# Patient Record
Sex: FEMALE | Race: WHITE | NOT HISPANIC OR LATINO
[De-identification: names, ages, dates, MRNs, and addresses within clinical notes are randomized per-mention and may not be internally consistent; named-entity substitution may affect disease eponyms.]

---

## 2018-09-12 ENCOUNTER — RESULT REVIEW (OUTPATIENT)
Age: 53
End: 2018-09-12

## 2019-04-01 ENCOUNTER — EMERGENCY (EMERGENCY)
Facility: HOSPITAL | Age: 54
LOS: 1 days | Discharge: HOME | End: 2019-04-01
Attending: EMERGENCY MEDICINE | Admitting: EMERGENCY MEDICINE
Payer: COMMERCIAL

## 2019-04-01 VITALS
SYSTOLIC BLOOD PRESSURE: 123 MMHG | WEIGHT: 169.98 LBS | RESPIRATION RATE: 18 BRPM | HEART RATE: 92 BPM | HEIGHT: 68 IN | DIASTOLIC BLOOD PRESSURE: 85 MMHG | TEMPERATURE: 99 F | OXYGEN SATURATION: 98 %

## 2019-04-01 VITALS
RESPIRATION RATE: 16 BRPM | HEART RATE: 69 BPM | TEMPERATURE: 99 F | OXYGEN SATURATION: 100 % | DIASTOLIC BLOOD PRESSURE: 75 MMHG | SYSTOLIC BLOOD PRESSURE: 109 MMHG

## 2019-04-01 DIAGNOSIS — Z90.13 ACQUIRED ABSENCE OF BILATERAL BREASTS AND NIPPLES: Chronic | ICD-10-CM

## 2019-04-01 DIAGNOSIS — Z90.722 ACQUIRED ABSENCE OF OVARIES, BILATERAL: Chronic | ICD-10-CM

## 2019-04-01 LAB
ALBUMIN SERPL ELPH-MCNC: 3.7 G/DL — SIGNIFICANT CHANGE UP (ref 3.3–5)
ALP SERPL-CCNC: 52 U/L — SIGNIFICANT CHANGE UP (ref 40–120)
ALT FLD-CCNC: 25 U/L — SIGNIFICANT CHANGE UP (ref 12–78)
ANION GAP SERPL CALC-SCNC: 8 MMOL/L — SIGNIFICANT CHANGE UP (ref 5–17)
APPEARANCE UR: CLEAR — SIGNIFICANT CHANGE UP
AST SERPL-CCNC: 13 U/L — LOW (ref 15–37)
BACTERIA # UR AUTO: NEGATIVE — SIGNIFICANT CHANGE UP
BASOPHILS # BLD AUTO: 0 K/UL — SIGNIFICANT CHANGE UP (ref 0–0.2)
BASOPHILS NFR BLD AUTO: 0 % — SIGNIFICANT CHANGE UP (ref 0–2)
BILIRUB SERPL-MCNC: 0.5 MG/DL — SIGNIFICANT CHANGE UP (ref 0.2–1.2)
BILIRUB UR-MCNC: NEGATIVE — SIGNIFICANT CHANGE UP
BUN SERPL-MCNC: 13 MG/DL — SIGNIFICANT CHANGE UP (ref 7–23)
CALCIUM SERPL-MCNC: 8.3 MG/DL — LOW (ref 8.5–10.1)
CHLORIDE SERPL-SCNC: 108 MMOL/L — SIGNIFICANT CHANGE UP (ref 96–108)
CO2 SERPL-SCNC: 27 MMOL/L — SIGNIFICANT CHANGE UP (ref 22–31)
COLOR SPEC: SIGNIFICANT CHANGE UP
CREAT SERPL-MCNC: 0.73 MG/DL — SIGNIFICANT CHANGE UP (ref 0.5–1.3)
DIFF PNL FLD: ABNORMAL
EOSINOPHIL # BLD AUTO: 0.19 K/UL — SIGNIFICANT CHANGE UP (ref 0–0.5)
EOSINOPHIL NFR BLD AUTO: 2 % — SIGNIFICANT CHANGE UP (ref 0–6)
EPI CELLS # UR: NEGATIVE — SIGNIFICANT CHANGE UP
GLUCOSE SERPL-MCNC: 109 MG/DL — HIGH (ref 70–99)
GLUCOSE UR QL: NEGATIVE — SIGNIFICANT CHANGE UP
HCT VFR BLD CALC: 41.8 % — SIGNIFICANT CHANGE UP (ref 34.5–45)
HGB BLD-MCNC: 13.5 G/DL — SIGNIFICANT CHANGE UP (ref 11.5–15.5)
KETONES UR-MCNC: ABNORMAL
LEUKOCYTE ESTERASE UR-ACNC: NEGATIVE — SIGNIFICANT CHANGE UP
LIDOCAIN IGE QN: 153 U/L — SIGNIFICANT CHANGE UP (ref 73–393)
LYMPHOCYTES # BLD AUTO: 0.29 K/UL — LOW (ref 1–3.3)
LYMPHOCYTES # BLD AUTO: 3 % — LOW (ref 13–44)
MANUAL SMEAR VERIFICATION: SIGNIFICANT CHANGE UP
MCHC RBC-ENTMCNC: 29.6 PG — SIGNIFICANT CHANGE UP (ref 27–34)
MCHC RBC-ENTMCNC: 32.3 GM/DL — SIGNIFICANT CHANGE UP (ref 32–36)
MCV RBC AUTO: 91.7 FL — SIGNIFICANT CHANGE UP (ref 80–100)
MONOCYTES # BLD AUTO: 0.49 K/UL — SIGNIFICANT CHANGE UP (ref 0–0.9)
MONOCYTES NFR BLD AUTO: 5 % — SIGNIFICANT CHANGE UP (ref 2–14)
NEUTROPHILS # BLD AUTO: 8.74 K/UL — HIGH (ref 1.8–7.4)
NEUTROPHILS NFR BLD AUTO: 84 % — HIGH (ref 43–77)
NEUTS BAND # BLD: 6 % — SIGNIFICANT CHANGE UP (ref 0–8)
NITRITE UR-MCNC: NEGATIVE — SIGNIFICANT CHANGE UP
NRBC # BLD: 0 — SIGNIFICANT CHANGE UP
NRBC # BLD: SIGNIFICANT CHANGE UP /100 WBCS (ref 0–0)
PH UR: 6 — SIGNIFICANT CHANGE UP (ref 5–8)
PLAT MORPH BLD: NORMAL — SIGNIFICANT CHANGE UP
PLATELET # BLD AUTO: 304 K/UL — SIGNIFICANT CHANGE UP (ref 150–400)
POTASSIUM SERPL-MCNC: 4 MMOL/L — SIGNIFICANT CHANGE UP (ref 3.5–5.3)
POTASSIUM SERPL-SCNC: 4 MMOL/L — SIGNIFICANT CHANGE UP (ref 3.5–5.3)
PROT SERPL-MCNC: 6.7 G/DL — SIGNIFICANT CHANGE UP (ref 6–8.3)
PROT UR-MCNC: NEGATIVE — SIGNIFICANT CHANGE UP
RBC # BLD: 4.56 M/UL — SIGNIFICANT CHANGE UP (ref 3.8–5.2)
RBC # FLD: 12.6 % — SIGNIFICANT CHANGE UP (ref 10.3–14.5)
RBC BLD AUTO: SIGNIFICANT CHANGE UP
RBC CASTS # UR COMP ASSIST: ABNORMAL /HPF (ref 0–4)
SODIUM SERPL-SCNC: 143 MMOL/L — SIGNIFICANT CHANGE UP (ref 135–145)
SP GR SPEC: 1 — LOW (ref 1.01–1.02)
UROBILINOGEN FLD QL: NEGATIVE — SIGNIFICANT CHANGE UP
WBC # BLD: 9.71 K/UL — SIGNIFICANT CHANGE UP (ref 3.8–10.5)
WBC # FLD AUTO: 9.71 K/UL — SIGNIFICANT CHANGE UP (ref 3.8–10.5)
WBC UR QL: SIGNIFICANT CHANGE UP

## 2019-04-01 PROCEDURE — 99284 EMERGENCY DEPT VISIT MOD MDM: CPT

## 2019-04-01 PROCEDURE — 96375 TX/PRO/DX INJ NEW DRUG ADDON: CPT

## 2019-04-01 PROCEDURE — 80053 COMPREHEN METABOLIC PANEL: CPT

## 2019-04-01 PROCEDURE — 96374 THER/PROPH/DIAG INJ IV PUSH: CPT

## 2019-04-01 PROCEDURE — 81001 URINALYSIS AUTO W/SCOPE: CPT

## 2019-04-01 PROCEDURE — 85027 COMPLETE CBC AUTOMATED: CPT

## 2019-04-01 PROCEDURE — 83690 ASSAY OF LIPASE: CPT

## 2019-04-01 PROCEDURE — 36415 COLL VENOUS BLD VENIPUNCTURE: CPT

## 2019-04-01 PROCEDURE — 99284 EMERGENCY DEPT VISIT MOD MDM: CPT | Mod: 25

## 2019-04-01 PROCEDURE — 96361 HYDRATE IV INFUSION ADD-ON: CPT

## 2019-04-01 RX ORDER — SODIUM CHLORIDE 9 MG/ML
1000 INJECTION INTRAMUSCULAR; INTRAVENOUS; SUBCUTANEOUS ONCE
Qty: 0 | Refills: 0 | Status: COMPLETED | OUTPATIENT
Start: 2019-04-01 | End: 2019-04-01

## 2019-04-01 RX ORDER — ONDANSETRON 8 MG/1
1 TABLET, FILM COATED ORAL
Qty: 12 | Refills: 0 | OUTPATIENT
Start: 2019-04-01 | End: 2019-04-03

## 2019-04-01 RX ORDER — ONDANSETRON 8 MG/1
4 TABLET, FILM COATED ORAL ONCE
Qty: 0 | Refills: 0 | Status: COMPLETED | OUTPATIENT
Start: 2019-04-01 | End: 2019-04-01

## 2019-04-01 RX ORDER — FAMOTIDINE 10 MG/ML
1 INJECTION INTRAVENOUS
Qty: 10 | Refills: 0 | OUTPATIENT
Start: 2019-04-01 | End: 2019-04-05

## 2019-04-01 RX ORDER — FAMOTIDINE 10 MG/ML
20 INJECTION INTRAVENOUS ONCE
Qty: 0 | Refills: 0 | Status: COMPLETED | OUTPATIENT
Start: 2019-04-01 | End: 2019-04-01

## 2019-04-01 RX ORDER — ACETAMINOPHEN 500 MG
650 TABLET ORAL ONCE
Qty: 0 | Refills: 0 | Status: DISCONTINUED | OUTPATIENT
Start: 2019-04-01 | End: 2019-04-05

## 2019-04-01 RX ADMIN — SODIUM CHLORIDE 1000 MILLILITER(S): 9 INJECTION INTRAMUSCULAR; INTRAVENOUS; SUBCUTANEOUS at 08:19

## 2019-04-01 RX ADMIN — SODIUM CHLORIDE 1000 MILLILITER(S): 9 INJECTION INTRAMUSCULAR; INTRAVENOUS; SUBCUTANEOUS at 07:15

## 2019-04-01 RX ADMIN — SODIUM CHLORIDE 1000 MILLILITER(S): 9 INJECTION INTRAMUSCULAR; INTRAVENOUS; SUBCUTANEOUS at 05:45

## 2019-04-01 RX ADMIN — ONDANSETRON 4 MILLIGRAM(S): 8 TABLET, FILM COATED ORAL at 05:45

## 2019-04-01 RX ADMIN — FAMOTIDINE 20 MILLIGRAM(S): 10 INJECTION INTRAVENOUS at 05:45

## 2019-04-01 RX ADMIN — SODIUM CHLORIDE 1000 MILLILITER(S): 9 INJECTION INTRAMUSCULAR; INTRAVENOUS; SUBCUTANEOUS at 07:18

## 2019-04-01 NOTE — ED ADULT NURSE NOTE - OBJECTIVE STATEMENT
received patient in bed 10a, witnessed ambulating. C/o N/V and diarrhea since last night. Cramping earlier in night with diarrhea. IV/labs/Meds/IVF completed at this time. Awaiting re-eval

## 2019-04-01 NOTE — ED PROVIDER NOTE - PROGRESS NOTE DETAILS
patient abdomen soft, non tender, labs reviwed, to dc home, given instructions to return to ER for worsening of symptoms

## 2019-04-01 NOTE — ED PROVIDER NOTE - OBJECTIVE STATEMENT
54 female presents to ER c/o nausea, vomiting and diarrhea, states she went out to restaurant and ate sushi, states her son has the same symptoms.

## 2019-04-02 ENCOUNTER — TRANSCRIPTION ENCOUNTER (OUTPATIENT)
Age: 54
End: 2019-04-02

## 2020-12-14 NOTE — ED ADULT NURSE NOTE - PRO INTERPRETER NEED 2
Patient will now be using [facility] to complete her MRI. Patient's appointment is scheduled for 12/21.      [facility]   [id number] [address] [place]

## 2022-03-15 NOTE — ED ADULT NURSE NOTE - PMH
Addended by: PRABHA MONROY on: 3/15/2022 02:26 PM     Modules accepted: Orders     Anxiety    Hypothyroid

## 2022-12-12 ENCOUNTER — OFFICE (OUTPATIENT)
Dept: URBAN - METROPOLITAN AREA CLINIC 70 | Facility: CLINIC | Age: 57
Setting detail: OPHTHALMOLOGY
End: 2022-12-12
Payer: COMMERCIAL

## 2022-12-12 DIAGNOSIS — H16.222: ICD-10-CM

## 2022-12-12 DIAGNOSIS — H40.013: ICD-10-CM

## 2022-12-12 DIAGNOSIS — H35.54: ICD-10-CM

## 2022-12-12 DIAGNOSIS — H16.221: ICD-10-CM

## 2022-12-12 DIAGNOSIS — D31.32: ICD-10-CM

## 2022-12-12 DIAGNOSIS — H52.7: ICD-10-CM

## 2022-12-12 DIAGNOSIS — H25.13: ICD-10-CM

## 2022-12-12 DIAGNOSIS — H16.223: ICD-10-CM

## 2022-12-12 PROCEDURE — 76514 ECHO EXAM OF EYE THICKNESS: CPT | Performed by: OPHTHALMOLOGY

## 2022-12-12 PROCEDURE — 92250 FUNDUS PHOTOGRAPHY W/I&R: CPT | Performed by: OPHTHALMOLOGY

## 2022-12-12 PROCEDURE — 92015 DETERMINE REFRACTIVE STATE: CPT | Performed by: OPHTHALMOLOGY

## 2022-12-12 PROCEDURE — 92014 COMPRE OPH EXAM EST PT 1/>: CPT | Performed by: OPHTHALMOLOGY

## 2022-12-12 PROCEDURE — 83861 MICROFLUID ANALY TEARS: CPT | Performed by: OPHTHALMOLOGY

## 2022-12-12 ASSESSMENT — CONFRONTATIONAL VISUAL FIELD TEST (CVF)
OD_FINDINGS: FULL
OS_FINDINGS: FULL

## 2022-12-12 ASSESSMENT — PACHYMETRY
OD_CT_CORRECTION: -5
OS_CT_UM: 636
OD_CT_UM: 616
OS_CT_CORRECTION: -6

## 2022-12-12 ASSESSMENT — SUPERFICIAL PUNCTATE KERATITIS (SPK)
OS_SPK: 1+
OD_SPK: 1+

## 2022-12-14 PROBLEM — H16.222 DRY EYE SYNDROME K SICCA; RIGHT EYE, LEFT EYE, BOTH EYES: Status: ACTIVE | Noted: 2022-12-12

## 2022-12-14 PROBLEM — H16.221 DRY EYE SYNDROME K SICCA; RIGHT EYE, LEFT EYE, BOTH EYES: Status: ACTIVE | Noted: 2022-12-12

## 2022-12-14 PROBLEM — H16.223 DRY EYE SYNDROME K SICCA; RIGHT EYE, LEFT EYE, BOTH EYES: Status: ACTIVE | Noted: 2022-12-12

## 2022-12-14 ASSESSMENT — AXIALLENGTH_DERIVED
OD_AL: 23.9449
OD_AL: 24.1473
OS_AL: 23.9006
OS_AL: 23.801
OD_AL: 24.0457
OS_AL: 23.8507

## 2022-12-14 ASSESSMENT — REFRACTION_AUTOREFRACTION
OS_CYLINDER: -0.75
OD_CYLINDER: -0.25
OS_AXIS: 096
OD_AXIS: 090
OD_SPHERE: +1.00
OS_SPHERE: +1.25

## 2022-12-14 ASSESSMENT — REFRACTION_CURRENTRX
OS_CYLINDER: -0.50
OD_CYLINDER: -0.50
OD_SPHERE: +3.00
OS_OVR_VA: 20/
OS_SPHERE: +3.00
OS_AXIS: 090
OD_AXIS: 092
OD_OVR_VA: 20/

## 2022-12-14 ASSESSMENT — REFRACTION_MANIFEST
OD_SPHERE: +0.50
OD_CYLINDER: -0.25
OS_AXIS: 100
OU_VA: 20/20
OS_ADD: +2.75
OS_CYLINDER: -0.50
OS_VA1: 20/20
OS_SPHERE: +1.25
OS_CYLINDER: -0.50
OD_VA1: 20/25
OD_VA1: 20/20
OD_ADD: +3.00
OS_SPHERE: +1.00
OD_VA2: 20/20
OS_ADD: +3.00
OD_AXIS: 90
OS_VA2: 20/20
OD_AXIS: 080
OD_CYLINDER: -0.25
OD_ADD: +2.75
OD_SPHERE: +0.75
OS_AXIS: 095
OS_VA1: 20/25

## 2022-12-14 ASSESSMENT — SPHEQUIV_DERIVED
OD_SPHEQUIV: 0.625
OS_SPHEQUIV: 0.875
OD_SPHEQUIV: 0.875
OD_SPHEQUIV: 0.375
OS_SPHEQUIV: 1
OS_SPHEQUIV: 0.75

## 2022-12-14 ASSESSMENT — KERATOMETRY
OS_AXISANGLE_DEGREES: 102
OS_K2POWER_DIOPTERS: 42.00
OD_K2POWER_DIOPTERS: 41.75
OD_K1POWER_DIOPTERS: 41.50
OS_K1POWER_DIOPTERS: 41.75
OD_AXISANGLE_DEGREES: 060

## 2022-12-14 ASSESSMENT — VISUAL ACUITY
OD_BCVA: 20/40
OS_BCVA: 20/50

## 2023-09-27 ENCOUNTER — OFFICE (OUTPATIENT)
Dept: URBAN - METROPOLITAN AREA CLINIC 70 | Facility: CLINIC | Age: 58
Setting detail: OPHTHALMOLOGY
End: 2023-09-27
Payer: COMMERCIAL

## 2023-09-27 DIAGNOSIS — H16.223: ICD-10-CM

## 2023-09-27 DIAGNOSIS — D31.32: ICD-10-CM

## 2023-09-27 DIAGNOSIS — H25.11: ICD-10-CM

## 2023-09-27 DIAGNOSIS — H40.013: ICD-10-CM

## 2023-09-27 DIAGNOSIS — H25.13: ICD-10-CM

## 2023-09-27 DIAGNOSIS — H35.54: ICD-10-CM

## 2023-09-27 PROBLEM — H25.12 CATARACT SENILE NUCLEAR SCLEROSIS; RIGHT EYE, LEFT EYE, BOTH EYES: Status: ACTIVE | Noted: 2023-09-27

## 2023-09-27 PROCEDURE — 92133 CPTRZD OPH DX IMG PST SGM ON: CPT | Performed by: OPHTHALMOLOGY

## 2023-09-27 PROCEDURE — 92136 OPHTHALMIC BIOMETRY: CPT | Performed by: OPHTHALMOLOGY

## 2023-09-27 PROCEDURE — 92014 COMPRE OPH EXAM EST PT 1/>: CPT | Performed by: OPHTHALMOLOGY

## 2023-09-27 ASSESSMENT — REFRACTION_MANIFEST
OS_CYLINDER: -0.50
OD_ADD: +2.75
OD_SPHERE: +0.50
OS_CYLINDER: -0.50
OD_ADD: +2.75
OS_ADD: +3.00
OD_SPHERE: +0.75
OD_CYLINDER: -0.25
OS_AXIS: 100
OS_VA1: 20/20
OD_ADD: +3.00
OS_AXIS: 100
OS_VA1: 20/25
OD_AXIS: 080
OD_VA1: 20/25
OS_SPHERE: +1.00
OD_AXIS: 90
OS_VA2: 20/20
OS_SPHERE: +1.25
OS_SPHERE: +1.00
OD_AXIS: 100
OS_CYLINDER: -0.50
OD_VA1: 20/25
OU_VA: 20/20
OS_ADD: +2.75
OD_CYLINDER: -0.25
OS_AXIS: 095
OD_VA2: 20/20
OS_VA1: 20/25
OD_CYLINDER: -0.25
OD_SPHERE: +0.75
OD_VA1: 20/20
OS_ADD: +2.75

## 2023-09-27 ASSESSMENT — REFRACTION_AUTOREFRACTION
OS_CYLINDER: -0.75
OS_AXIS: 103
OD_AXIS: 100
OD_SPHERE: +0.75
OD_CYLINDER: -0.25
OS_SPHERE: +1.25

## 2023-09-27 ASSESSMENT — SPHEQUIV_DERIVED
OS_SPHEQUIV: 0.75
OS_SPHEQUIV: 0.875
OS_SPHEQUIV: 1
OD_SPHEQUIV: 0.625
OD_SPHEQUIV: 0.375
OS_SPHEQUIV: 0.75
OD_SPHEQUIV: 0.625
OD_SPHEQUIV: 0.625

## 2023-09-27 ASSESSMENT — KERATOMETRY
OD_AXISANGLE_DEGREES: 090
OD_K2POWER_DIOPTERS: 41.75
OS_K2POWER_DIOPTERS: 42.00
OS_K1K2_AVERAGE: 41.75
OD_K1POWER_DIOPTERS: 41.75
OD_AXISANGLE2_DEGREES: 090
OS_AXISANGLE_DEGREES: 100
OS_AXISANGLE_DEGREES: 100
OD_K1K2_AVERAGE: 41.75
OS_K1POWER_DIOPTERS: 41.50
OS_CYLAXISANGLE_DEGREES: 100
OD_CYLAXISANGLE_DEGREES: 90
OD_K2POWER_DIOPTERS: 41.75
OD_K1POWER_DIOPTERS: 41.75
OS_AXISANGLE2_DEGREES: 100
OS_K2POWER_DIOPTERS: 42.00
OS_CYLPOWER_DEGREES: 0.5
OS_K1POWER_DIOPTERS: 41.50
OD_AXISANGLE_DEGREES: 090

## 2023-09-27 ASSESSMENT — REFRACTION_CURRENTRX
OS_CYLINDER: -0.50
OS_AXIS: 090
OS_SPHERE: +3.00
OD_AXIS: 092
OD_OVR_VA: 20/
OD_SPHERE: +3.00
OS_OVR_VA: 20/
OD_CYLINDER: -0.50

## 2023-09-27 ASSESSMENT — AXIALLENGTH_DERIVED
OD_AL: 24.0993
OS_AL: 23.8478
OS_AL: 23.9478
OS_AL: 23.8977
OD_AL: 23.9981
OS_AL: 23.9478
OD_AL: 23.9981
OD_AL: 23.9981

## 2023-09-27 ASSESSMENT — CONFRONTATIONAL VISUAL FIELD TEST (CVF)
OD_FINDINGS: FULL
OS_FINDINGS: FULL

## 2023-09-27 ASSESSMENT — VISUAL ACUITY
OS_BCVA: 20/50
OD_BCVA: 20/40

## 2023-09-27 ASSESSMENT — SUPERFICIAL PUNCTATE KERATITIS (SPK)
OD_SPK: 1+
OS_SPK: 1+

## 2023-11-24 ENCOUNTER — ASC (OUTPATIENT)
Dept: URBAN - METROPOLITAN AREA SURGERY 8 | Facility: SURGERY | Age: 58
Setting detail: OPHTHALMOLOGY
End: 2023-11-24
Payer: COMMERCIAL

## 2023-11-24 DIAGNOSIS — H25.11: ICD-10-CM

## 2023-11-24 DIAGNOSIS — H52.211: ICD-10-CM

## 2023-11-24 PROCEDURE — 66984 XCAPSL CTRC RMVL W/O ECP: CPT | Mod: RT | Performed by: OPHTHALMOLOGY

## 2023-11-24 PROCEDURE — FEMTO PRECISION LASER CATARACT SURGERY: Mod: GY | Performed by: OPHTHALMOLOGY

## 2023-11-25 ENCOUNTER — RX ONLY (RX ONLY)
Age: 58
End: 2023-11-25

## 2023-11-25 ENCOUNTER — OFFICE (OUTPATIENT)
Dept: URBAN - METROPOLITAN AREA CLINIC 70 | Facility: CLINIC | Age: 58
Setting detail: OPHTHALMOLOGY
End: 2023-11-25
Payer: COMMERCIAL

## 2023-11-25 DIAGNOSIS — Z96.1: ICD-10-CM

## 2023-11-25 PROCEDURE — 99024 POSTOP FOLLOW-UP VISIT: CPT | Performed by: OPHTHALMOLOGY

## 2023-11-25 ASSESSMENT — REFRACTION_MANIFEST
OS_SPHERE: +1.00
OD_SPHERE: +0.75
OD_ADD: +3.00
OD_CYLINDER: -0.25
OS_ADD: +2.75
OD_ADD: +2.75
OD_AXIS: 080
OD_VA1: 20/20
OS_ADD: +2.75
OD_ADD: +2.75
OD_CYLINDER: -0.25
OS_AXIS: 100
OD_VA1: 20/25
OD_AXIS: 90
OS_SPHERE: +1.00
OU_VA: 20/20
OS_CYLINDER: -0.50
OD_SPHERE: +0.75
OS_CYLINDER: -0.50
OS_VA1: 20/25
OD_VA1: 20/25
OS_ADD: +3.00
OS_VA1: 20/20
OS_AXIS: 095
OS_VA1: 20/25
OS_SPHERE: +1.25
OS_AXIS: 100
OS_VA2: 20/20
OD_SPHERE: +0.50
OD_CYLINDER: -0.25
OD_AXIS: 100
OS_CYLINDER: -0.50
OD_VA2: 20/20

## 2023-11-25 ASSESSMENT — SPHEQUIV_DERIVED
OS_SPHEQUIV: 1
OD_SPHEQUIV: 0.625
OD_SPHEQUIV: -0.5
OS_SPHEQUIV: 1.125
OS_SPHEQUIV: 0.75
OD_SPHEQUIV: 0.625
OD_SPHEQUIV: 0.375
OS_SPHEQUIV: 0.75

## 2023-11-25 ASSESSMENT — REFRACTION_AUTOREFRACTION
OS_AXIS: 098
OS_SPHERE: +1.50
OD_SPHERE: -0.25
OD_CYLINDER: -0.50
OD_AXIS: 046
OS_CYLINDER: -0.75

## 2023-11-25 ASSESSMENT — REFRACTION_CURRENTRX
OS_OVR_VA: 20/
OD_AXIS: 092
OS_AXIS: 090
OD_OVR_VA: 20/
OS_SPHERE: +3.00
OD_CYLINDER: -0.50
OS_CYLINDER: -0.50
OD_SPHERE: +3.00

## 2023-11-25 ASSESSMENT — CONFRONTATIONAL VISUAL FIELD TEST (CVF)
OD_FINDINGS: FULL
OS_FINDINGS: FULL

## 2023-11-25 ASSESSMENT — CORNEAL EDEMA CLINICAL DESCRIPTION: OD_CORNEALEDEMA: 1+

## 2023-11-25 ASSESSMENT — SUPERFICIAL PUNCTATE KERATITIS (SPK)
OS_SPK: 1+
OD_SPK: 1+

## 2023-11-30 ENCOUNTER — OFFICE (OUTPATIENT)
Dept: URBAN - METROPOLITAN AREA CLINIC 70 | Facility: CLINIC | Age: 58
Setting detail: OPHTHALMOLOGY
End: 2023-11-30
Payer: COMMERCIAL

## 2023-11-30 DIAGNOSIS — H25.12: ICD-10-CM

## 2023-11-30 DIAGNOSIS — Z96.1: ICD-10-CM

## 2023-11-30 PROCEDURE — 92136 OPHTHALMIC BIOMETRY: CPT | Performed by: OPHTHALMOLOGY

## 2023-11-30 PROCEDURE — 99024 POSTOP FOLLOW-UP VISIT: CPT | Performed by: OPHTHALMOLOGY

## 2023-11-30 ASSESSMENT — REFRACTION_MANIFEST
OD_AXIS: 100
OD_CYLINDER: -0.25
OS_ADD: +2.75
OD_VA1: 20/20
OS_ADD: +3.00
OD_CYLINDER: -0.25
OS_SPHERE: +1.25
OD_AXIS: 080
OS_VA1: 20/20
OD_VA1: 20/25
OD_ADD: +2.75
OD_SPHERE: +0.75
OS_SPHERE: +1.00
OS_AXIS: 100
OS_AXIS: 100
OS_SPHERE: +1.00
OU_VA: 20/20
OS_VA1: 20/25
OD_VA1: 20/25
OD_ADD: +2.75
OS_CYLINDER: -0.50
OD_SPHERE: +0.75
OS_CYLINDER: -0.50
OS_ADD: +2.75
OD_SPHERE: +0.50
OS_VA2: 20/20
OD_VA2: 20/20
OS_CYLINDER: -0.50
OD_ADD: +3.00
OS_VA1: 20/25
OS_AXIS: 095
OD_AXIS: 90
OD_CYLINDER: -0.25

## 2023-11-30 ASSESSMENT — SPHEQUIV_DERIVED
OS_SPHEQUIV: 0.75
OD_SPHEQUIV: 0.625
OD_SPHEQUIV: 0
OD_SPHEQUIV: 0.375
OD_SPHEQUIV: 0.625
OS_SPHEQUIV: 0.75
OS_SPHEQUIV: 1
OS_SPHEQUIV: 1

## 2023-11-30 ASSESSMENT — REFRACTION_CURRENTRX
OD_SPHERE: +3.00
OS_OVR_VA: 20/
OD_OVR_VA: 20/
OS_AXIS: 090
OS_CYLINDER: -0.50
OS_SPHERE: +3.00
OD_CYLINDER: -0.50
OD_AXIS: 092

## 2023-11-30 ASSESSMENT — REFRACTION_AUTOREFRACTION
OS_AXIS: 102
OD_SPHERE: +0.25
OD_AXIS: 083
OS_SPHERE: +1.50
OS_CYLINDER: -1.00
OD_CYLINDER: -0.50

## 2023-11-30 ASSESSMENT — CONFRONTATIONAL VISUAL FIELD TEST (CVF)
OS_FINDINGS: FULL
OD_FINDINGS: FULL

## 2023-11-30 ASSESSMENT — SUPERFICIAL PUNCTATE KERATITIS (SPK)
OS_SPK: 1+
OD_SPK: 1+

## 2023-12-04 NOTE — ED PROVIDER NOTE - EYES, MLM
[facility] Nurse Form  Form placed in [place] folder   Fax: [phone]
Faxed completed form.      Placed in "to be scanned" bin
Form has been completed and placed in the completed BLUE team folder in the clerical area.
Clear bilaterally, pupils equal, round and reactive to light.

## 2023-12-08 ENCOUNTER — ASC (OUTPATIENT)
Dept: URBAN - METROPOLITAN AREA SURGERY 8 | Facility: SURGERY | Age: 58
Setting detail: OPHTHALMOLOGY
End: 2023-12-08
Payer: COMMERCIAL

## 2023-12-08 ENCOUNTER — RX ONLY (RX ONLY)
Age: 58
End: 2023-12-08

## 2023-12-08 DIAGNOSIS — H52.212: ICD-10-CM

## 2023-12-08 DIAGNOSIS — H25.12: ICD-10-CM

## 2023-12-08 PROCEDURE — V2788P PANOPTIX: Performed by: OPHTHALMOLOGY

## 2023-12-08 PROCEDURE — FEMTO FEMTOSECOND LASER: Mod: GY | Performed by: OPHTHALMOLOGY

## 2023-12-08 PROCEDURE — 66984 XCAPSL CTRC RMVL W/O ECP: CPT | Mod: 79,LT | Performed by: OPHTHALMOLOGY

## 2023-12-09 ENCOUNTER — OFFICE (OUTPATIENT)
Dept: URBAN - METROPOLITAN AREA CLINIC 104 | Facility: CLINIC | Age: 58
Setting detail: OPHTHALMOLOGY
End: 2023-12-09
Payer: COMMERCIAL

## 2023-12-09 DIAGNOSIS — Z96.1: ICD-10-CM

## 2023-12-09 PROCEDURE — 99024 POSTOP FOLLOW-UP VISIT: CPT | Performed by: OPTOMETRIST

## 2023-12-09 ASSESSMENT — REFRACTION_AUTOREFRACTION
OS_SPHERE: +0.50
OD_CYLINDER: -0.75
OD_AXIS: 064
OD_SPHERE: +0.25

## 2023-12-09 ASSESSMENT — CONFRONTATIONAL VISUAL FIELD TEST (CVF)
OS_FINDINGS: FULL
OD_FINDINGS: FULL

## 2023-12-09 ASSESSMENT — SPHEQUIV_DERIVED: OD_SPHEQUIV: -0.125

## 2023-12-09 ASSESSMENT — SUPERFICIAL PUNCTATE KERATITIS (SPK)
OS_SPK: 1+
OD_SPK: 1+

## 2023-12-13 ENCOUNTER — OFFICE (OUTPATIENT)
Dept: URBAN - METROPOLITAN AREA CLINIC 70 | Facility: CLINIC | Age: 58
Setting detail: OPHTHALMOLOGY
End: 2023-12-13
Payer: COMMERCIAL

## 2023-12-13 DIAGNOSIS — Z96.1: ICD-10-CM

## 2023-12-13 PROCEDURE — 99024 POSTOP FOLLOW-UP VISIT: CPT | Performed by: OPHTHALMOLOGY

## 2023-12-13 ASSESSMENT — SPHEQUIV_DERIVED
OS_SPHEQUIV: 0.625
OD_SPHEQUIV: 0.5

## 2023-12-13 ASSESSMENT — REFRACTION_AUTOREFRACTION
OD_CYLINDER: +0.50
OD_SPHERE: +0.25
OD_AXIS: 093
OS_SPHERE: +1.00
OS_AXIS: 091
OS_CYLINDER: -0.75

## 2023-12-13 ASSESSMENT — CONFRONTATIONAL VISUAL FIELD TEST (CVF)
OS_FINDINGS: FULL
OD_FINDINGS: FULL

## 2023-12-13 ASSESSMENT — SUPERFICIAL PUNCTATE KERATITIS (SPK)
OD_SPK: 1+
OS_SPK: 1+

## 2024-01-10 ENCOUNTER — OFFICE (OUTPATIENT)
Dept: URBAN - METROPOLITAN AREA CLINIC 70 | Facility: CLINIC | Age: 59
Setting detail: OPHTHALMOLOGY
End: 2024-01-10
Payer: COMMERCIAL

## 2024-01-10 DIAGNOSIS — Z96.1: ICD-10-CM

## 2024-01-10 PROCEDURE — 99024 POSTOP FOLLOW-UP VISIT: CPT | Performed by: OPHTHALMOLOGY

## 2024-01-10 ASSESSMENT — REFRACTION_AUTOREFRACTION
OD_CYLINDER: -0.50
OD_SPHERE: +0.25
OS_AXIS: 083
OS_CYLINDER: -0.75
OS_SPHERE: +0.75
OD_AXIS: 082

## 2024-01-10 ASSESSMENT — REFRACTION_MANIFEST
OS_AXIS: 083
OS_CYLINDER: -0.75
OD_VA1: 20/20
OD_ADD: +2.00
OD_CYLINDER: SPHERE
OD_VA2: 20/20
OU_VA: 20/20
OD_SPHERE: PLANO
OS_ADD: +2.00
OS_SPHERE: +0.75
OS_VA1: 20/20
OS_VA2: 20/20

## 2024-01-10 ASSESSMENT — CONFRONTATIONAL VISUAL FIELD TEST (CVF)
OD_FINDINGS: FULL
OS_FINDINGS: FULL

## 2024-01-10 ASSESSMENT — SPHEQUIV_DERIVED
OS_SPHEQUIV: 0.375
OD_SPHEQUIV: 0
OS_SPHEQUIV: 0.375

## 2024-01-10 ASSESSMENT — SUPERFICIAL PUNCTATE KERATITIS (SPK)
OS_SPK: 1+
OD_SPK: 1+

## 2024-05-21 ENCOUNTER — OFFICE (OUTPATIENT)
Dept: URBAN - METROPOLITAN AREA CLINIC 70 | Facility: CLINIC | Age: 59
Setting detail: OPHTHALMOLOGY
End: 2024-05-21
Payer: COMMERCIAL

## 2024-05-21 ENCOUNTER — RX ONLY (RX ONLY)
Age: 59
End: 2024-05-21

## 2024-05-21 DIAGNOSIS — H43.813: ICD-10-CM

## 2024-05-21 PROCEDURE — 92014 COMPRE OPH EXAM EST PT 1/>: CPT | Performed by: OPHTHALMOLOGY

## 2024-05-21 ASSESSMENT — CONFRONTATIONAL VISUAL FIELD TEST (CVF)
OD_FINDINGS: FULL
OS_FINDINGS: FULL